# Patient Record
Sex: FEMALE | Race: WHITE
[De-identification: names, ages, dates, MRNs, and addresses within clinical notes are randomized per-mention and may not be internally consistent; named-entity substitution may affect disease eponyms.]

---

## 2018-06-01 ENCOUNTER — HOSPITAL ENCOUNTER (EMERGENCY)
Dept: HOSPITAL 80 - CED | Age: 53
Discharge: HOME | End: 2018-06-01
Payer: MEDICAID

## 2018-06-01 VITALS — DIASTOLIC BLOOD PRESSURE: 76 MMHG | SYSTOLIC BLOOD PRESSURE: 142 MMHG

## 2018-06-01 DIAGNOSIS — N39.0: Primary | ICD-10-CM

## 2018-06-01 DIAGNOSIS — Z87.891: ICD-10-CM

## 2018-06-01 NOTE — EDPHY
H & P


Stated Complaint: "Pinching" on urination.


Time Seen by Provider: 06/01/18 21:21


HPI/ROS: 





Chief Complaint:  Possible UTI





HPI:  53-year-old woman presenting with 5 days of urinary urgency frequency and 

dysuria.  Patient started taking Pyridium and cramp rate tablets several days 

ago but the symptoms are worsening.  No fevers or chills.  No back pain.  No 

nausea or vomiting.  She does not have a history of frequent UTIs in the past.  

She called her doctor was told to come in to be evaluated further.





ROS:  10 point Review of Systems is negative except as noted in the HPI.





PMH:  Denies





Social History: No smoking, no alcohol,  no recreational drug use





Family History: non-contributory





Physical Exam:


Gen: Awake, Alert, No Distress


HEENT:  


     Nose: no rhinorrhea


     Eyes: PERRLA, EOMI


     Mouth: Moist mucosa 


Neck: Supple, no JVD


Chest: nontender, lungs clear to auscultation


Heart: S1, S2 normal, no murmur


Abd: Soft, non-tender, no guarding


Back: no CVA tenderness, no midline tenderness 


Ext: no edema, non-tender


Skin: no rash


Neuro: CN II-XII intact, Sensation grossly intact, Strength 5/5 in bilateral 

upper and lower extremities








- Personal History


LMP (Females 10-55): Post Menopausal


Current Tetanus/Diphtheria Vaccine: Yes


Current Tetanus Diphtheria and Acellular Pertussis (TDAP): Yes





- Medical/Surgical History


Hx Asthma: No


Hx Chronic Respiratory Disease: No


Hx Diabetes: No


Hx Cardiac Disease: No


Hx Renal Disease: No


Hx Cirrhosis: No


Hx Alcoholism: No


Hx HIV/AIDS: No


Hx Splenectomy or Spleen Trauma: No


Other PMH: HYPOTHYROIDISM, PANIC ATTACKS, ANXIETY





- Social History


Smoking Status: Former smoker


Constitutional: 





 Initial Vital Signs











Temperature (C)  36.7 C   06/01/18 21:23


 


Heart Rate  75   06/01/18 21:23


 


Respiratory Rate  17   06/01/18 21:23


 


Blood Pressure  151/83 H  06/01/18 21:23


 


O2 Sat (%)  95   06/01/18 21:23








 











O2 Delivery Mode               Room Air














Allergies/Adverse Reactions: 


 





No Known Allergies Allergy (Unverified 10/25/16 12:56)


 








Home Medications: 














 Medication  Instructions  Recorded


 


Ondansetron Odt [Zofran Odt] 4 - 8 mg PO Q4PRN PRN #4 tab 10/25/16


 


Prozac 20 MG (*)  10/25/16


 


Thyroid  10/25/16


 


Xanax  10/25/16


 


Nitrofurantoin Monohyd/M-Cryst 100 mg PO BID #8 capsule 06/01/18





[Macrobid 100 mg Capsule]  














Medical Decision Making


ED Course/Re-evaluation: 





53-year-old with symptoms of urinary tract infection.  There are no findings 

suggestive of pyelonephritis at this time.  Unfortunately she has been taking 

Pyridium so will be unable to do a urine dip here.  I am going to treat her 

empirically with nitrofurantoin.  She will follow up with primary care 

physician early next week, return for any concerns.





Departure





- Departure


Disposition: Home, Routine, Self-Care


Clinical Impression: 


 Urinary tract infection





Condition: Good


Instructions:  Urinary Tract Infection in Women (ED)


Additional Instructions: 


Please take your full course of antibiotics.


You may continue taking the Pyridium for symptom relief.


Follow up with primary care physician in 4-5 days if symptoms are not improving.


Referrals: 


Dereck Lewis MD [Primary Care Provider] - As per Instructions


Prescriptions: 


Nitrofurantoin Monohyd/M-Cryst [Macrobid 100 mg Capsule] 100 mg PO BID #8 

capsule